# Patient Record
Sex: FEMALE | Race: WHITE | NOT HISPANIC OR LATINO | Employment: UNEMPLOYED | URBAN - METROPOLITAN AREA
[De-identification: names, ages, dates, MRNs, and addresses within clinical notes are randomized per-mention and may not be internally consistent; named-entity substitution may affect disease eponyms.]

---

## 2018-09-19 ENCOUNTER — HOSPITAL ENCOUNTER (EMERGENCY)
Facility: HOSPITAL | Age: 38
Discharge: HOME | End: 2018-09-19
Attending: EMERGENCY MEDICINE | Admitting: EMERGENCY MEDICINE
Payer: COMMERCIAL

## 2018-09-19 ENCOUNTER — APPOINTMENT (EMERGENCY)
Dept: RADIOLOGY | Facility: HOSPITAL | Age: 38
End: 2018-09-19
Payer: COMMERCIAL

## 2018-09-19 VITALS
OXYGEN SATURATION: 100 % | WEIGHT: 125 LBS | SYSTOLIC BLOOD PRESSURE: 144 MMHG | RESPIRATION RATE: 18 BRPM | TEMPERATURE: 98.3 F | BODY MASS INDEX: 20.09 KG/M2 | HEIGHT: 66 IN | DIASTOLIC BLOOD PRESSURE: 82 MMHG | HEART RATE: 101 BPM

## 2018-09-19 DIAGNOSIS — S63.601A SPRAIN OF RIGHT THUMB: Primary | ICD-10-CM

## 2018-09-19 PROCEDURE — 99283 EMERGENCY DEPT VISIT LOW MDM: CPT

## 2018-09-19 PROCEDURE — 73140 X-RAY EXAM OF FINGER(S): CPT

## 2018-09-20 NOTE — DISCHARGE INSTRUCTIONS
1 \[facility]\"" Academy of Orthopaedic Surgeons (AAOS): Sprained thumb  American Academy of Orthopaedic Surgeons (AAOS)  [address], [place]  2010  Available from URL: http://orthoinfo  aaos  org/topic cfm?umcis=P49044  As accessed 2010-12-14  Flaquito AW, Deanna Norman, & Mayte CM: Ulnar collateral ligament injury of the thumb metacarpophalangeal joint  Clin J Sport Med 2010; 20(2):106-112  THE [facility] ([facility]): Sprains and strains  THE [facility] ([facility])  [place], [place]  2009  Available from URL: [employer]  [facility] gov/Health_Info/Sprains_Strains/sprains_[email or web address]_strains_ff asp  As accessed 2010-12-14  American Academy of Orthopaedic Surgeons (AAOS): Sprains and strains: what's the difference? Lorena Kelly American Academy of Orthopaedic Surgeons (AAOS)  [address] [place] [address]  2007  Available from URL: http://orthoinfo  aaos  org/topic cfm?gvudr=K10491  As accessed 2010-12-14  [employer] [facility]: Sprains, strains & tears: What they are and what to do about them  [employer] [facility]  [place], [place]  2005  Available from URL: CommonFloorTimer gl  org/AM/Template cfm? Section=Brochures2&Template=/CM/ContentDisplay  cfm&OuchectZF=8191  As accessed 2010-12-14  Isaiah SC & Yves BG: Management of patients with musculoskeletal trauma  In: [place] [address] of Medical-Surgical Nursing, 10th ed  [address] [facility] [place], [place], 2004  Anne DF & Ubaldo TC: Management of common finger injuries  [address]; 43(5):4118-6935  Wilmer Kaiser [address] [place] [address]: Sprains and joint injuries in the hand  Hand Clin 1986; 2(1):93-98  © 2017 [address] Information is for End User's use only and may not be sold, redistributed or otherwise used for commercial purposes   All illustrations and images included in CareNotes® are the copyrighted property of [employer]  or [employer]  The above information is an  only  It is not intended as medical advice for individual conditions or treatments  Talk to your doctor, nurse or pharmacist before following any medical regimen to see if it is safe and effective for you

## 2018-09-20 NOTE — ED PROVIDER NOTES
History  Chief Complaint   Patient presents with    Thumb Injury     pt states she fell yesterday landing on her right thymb, pt believes its broke     Patient is a 45 year female with complaints of right thumb pain x1 day  Patient is a right-handed individual   Patient states that she tripped and fell on right thumb yesterday  Patient denies previous thumb injury  Hand Injury   Location:  Finger  Finger location:  R thumb  Injury: yes    Time since incident:  1 day  Mechanism of injury: fall    Pain details:     Radiates to:  Does not radiate    Severity:  Moderate    Progression:  Unchanged  Handedness:  Right-handed  Dislocation: no    Associated symptoms: no fever        None       History reviewed  No pertinent past medical history  History reviewed  No pertinent surgical history  History reviewed  No pertinent family history  I have reviewed and agree with the history as documented  Social History   Substance Use Topics    Smoking status: Current Every Day Smoker     Packs/day: 0 50     Types: Cigarettes    Smokeless tobacco: Not on file    Alcohol use No        Review of Systems   Constitutional: Negative for fever  Respiratory: Negative for shortness of breath  Cardiovascular: Negative for chest pain  Musculoskeletal: Positive for joint swelling  All other systems reviewed and are negative  Physical Exam  Physical Exam   Constitutional: She is oriented to person, place, and time  She appears well-developed and well-nourished  HENT:   Head: Normocephalic and atraumatic  Musculoskeletal:        Right hand: She exhibits tenderness, bony tenderness and swelling  She exhibits normal range of motion  Normal sensation noted  Hands:  Neurological: She is alert and oriented to person, place, and time  Skin: Skin is warm and dry  Psychiatric: She has a normal mood and affect  Her behavior is normal  Judgment and thought content normal    Vitals reviewed        Vital Signs  ED Triage Vitals [09/19/18 2018]   Temperature Pulse Respirations Blood Pressure SpO2   98 3 °F (36 8 °C) 101 18 144/82 100 %      Temp Source Heart Rate Source Patient Position - Orthostatic VS BP Location FiO2 (%)   Oral Monitor Sitting Left arm --      Pain Score       --           Vitals:    09/19/18 2018   BP: 144/82   Pulse: 101   Patient Position - Orthostatic VS: Sitting       Visual Acuity      ED Medications  Medications - No data to display    Diagnostic Studies  Results Reviewed     None                 XR thumb first digit-min 2 views RIGHT   ED Interpretation by Betty Gil PA-C (09/19 2055)   No acute fracture or dislocation noted                 Procedures  Procedures       Phone Contacts  ED Phone Contact    ED Course                               MDM  CritCare Time    Disposition  Final diagnoses:   Sprain of right thumb     Time reflects when diagnosis was documented in both MDM as applicable and the Disposition within this note     Time User Action Codes Description Comment    9/19/2018  8:49 PM Rani Woodward Add [H38 209P] Sprain of right thumb       ED Disposition     ED Disposition Condition Comment    Discharge  Leticia Reenstra discharge to home/self care  Condition at discharge: Good        Follow-up Information     Follow up With Specialties Details Why [address] [facility] In 1 day  [address] [facility] [address]  [phone]          Patient's Medications    No medications on file     No discharge procedures on file      ED Provider  Electronically Signed by           Betty Gil PA-C  09/19/18 2121

## 2020-06-23 ENCOUNTER — OFFICE VISIT (OUTPATIENT)
Dept: URGENT CARE | Facility: MEDICAL CENTER | Age: 40
End: 2020-06-23
Payer: COMMERCIAL

## 2020-06-23 VITALS
HEART RATE: 100 BPM | DIASTOLIC BLOOD PRESSURE: 68 MMHG | OXYGEN SATURATION: 98 % | RESPIRATION RATE: 18 BRPM | TEMPERATURE: 97.3 F | SYSTOLIC BLOOD PRESSURE: 133 MMHG

## 2020-06-23 DIAGNOSIS — H02.843 SWELLING OF RIGHT EYELID: Primary | ICD-10-CM

## 2020-06-23 PROCEDURE — G0382 LEV 3 HOSP TYPE B ED VISIT: HCPCS | Performed by: PHYSICIAN ASSISTANT

## 2020-06-23 PROCEDURE — 99283 EMERGENCY DEPT VISIT LOW MDM: CPT | Performed by: PHYSICIAN ASSISTANT

## 2020-06-23 PROCEDURE — 99203 OFFICE O/P NEW LOW 30 MIN: CPT | Performed by: PHYSICIAN ASSISTANT

## 2020-06-23 RX ORDER — AMOXICILLIN 500 MG/1
500 CAPSULE ORAL EVERY 12 HOURS SCHEDULED
Qty: 14 CAPSULE | Refills: 0 | Status: SHIPPED | OUTPATIENT
Start: 2020-06-23 | End: 2020-06-30

## 2021-01-03 ENCOUNTER — OFFICE VISIT (OUTPATIENT)
Dept: URGENT CARE | Facility: MEDICAL CENTER | Age: 41
End: 2021-01-03
Payer: COMMERCIAL

## 2021-01-03 VITALS
RESPIRATION RATE: 18 BRPM | OXYGEN SATURATION: 100 % | SYSTOLIC BLOOD PRESSURE: 149 MMHG | WEIGHT: 134 LBS | TEMPERATURE: 98.1 F | BODY MASS INDEX: 21.53 KG/M2 | HEART RATE: 96 BPM | HEIGHT: 66 IN | DIASTOLIC BLOOD PRESSURE: 80 MMHG

## 2021-01-03 DIAGNOSIS — H02.843 SWELLING OF EYELID, RIGHT: Primary | ICD-10-CM

## 2021-01-03 PROCEDURE — G0382 LEV 3 HOSP TYPE B ED VISIT: HCPCS | Performed by: PHYSICIAN ASSISTANT

## 2021-01-03 PROCEDURE — 99283 EMERGENCY DEPT VISIT LOW MDM: CPT | Performed by: PHYSICIAN ASSISTANT

## 2021-01-03 PROCEDURE — 99213 OFFICE O/P EST LOW 20 MIN: CPT | Performed by: PHYSICIAN ASSISTANT

## 2021-01-03 RX ORDER — MEDROXYPROGESTERONE ACETATE 150 MG/ML
150 INJECTION, SUSPENSION INTRAMUSCULAR
COMMUNITY
Start: 2020-11-25

## 2021-01-03 RX ORDER — PREDNISONE 20 MG/1
20 TABLET ORAL DAILY
Qty: 5 TABLET | Refills: 0 | Status: SHIPPED | OUTPATIENT
Start: 2021-01-03

## 2021-01-03 NOTE — PROGRESS NOTES
[facility]        NAME: Herb Reveles is a 36 y o  female  : 1980    MRN: [id number]  DATE: January 3, 2021  TIME: 1:38 PM    Assessment and Plan   Swelling of eyelid, right [H02 [id number]]  1  Swelling of eyelid, right  predniSONE 20 mg tablet         Patient Instructions   Cool compresses to eye  Steroids for swelling  Follow up with PCP in 3-5 days  Proceed to  ER if symptoms worsen  Chief Complaint     Chief Complaint   Patient presents with    Eye Problem     Pt  with swelling to her right eye that began a couple days ago  History of Present Illness       Patient is a 44-year-old female who presents today with right eyelid swelling for the past few days  Denies pain and states it is itchy, she has been rubbing it  Denies foreign body sensation or redness of the eye  No discharge  Has been trying to put cool lotion on the eye to help the itching  She has not started any new medications, no new lotions, soaps, detergent  No blurred vision  Review of Systems   Review of Systems   Constitutional: Negative for fever  Eyes: Positive for itching  Negative for photophobia, pain, discharge, redness and visual disturbance  Eyelid swelling   Respiratory: Negative for shortness of breath  Cardiovascular: Negative for chest pain           Current Medications       Current Outpatient Medications:     medroxyPROGESTERone acetate (DEPO-PROVERA SYRINGE) 150 mg/mL injection, Inject 150 mg into a muscle, Disp: , Rfl:     predniSONE 20 mg tablet, Take 1 tablet (20 mg total) by mouth daily, Disp: 5 tablet, Rfl: 0    Current Allergies     Allergies as of 2021 - Reviewed 2021   Allergen Reaction Noted    Sulfa antibiotics Hives 2016            The following portions of the patient's history were reviewed and updated as appropriate: allergies, current medications, past family history, past medical history, past social history, past surgical history and problem list      No past medical history on file  No past surgical history on file  No family history on file  Medications have been verified  Objective   /80   Pulse 96   Temp 98 1 °F (36 7 °C)   Resp 18   Ht 5' 6" (1 676 m)   Wt 60 8 kg (134 lb)   SpO2 100%   BMI 21 63 kg/m²        Physical Exam     Physical Exam  Constitutional:       General: She is not in acute distress  Appearance: Normal appearance  She is normal weight  HENT:      Mouth/Throat:      Mouth: Mucous membranes are moist       Pharynx: Oropharynx is clear  Eyes:      General: Lids are everted, no foreign bodies appreciated  Vision grossly intact  Right eye: No foreign body, discharge or hordeolum  Extraocular Movements: Extraocular movements intact  Conjunctiva/sclera: Conjunctivae normal       Pupils: Pupils are equal, round, and reactive to light  Cardiovascular:      Rate and Rhythm: Normal rate and regular rhythm  Pulmonary:      Effort: Pulmonary effort is normal    Skin:     General: Skin is warm and dry  Neurological:      Mental Status: She is alert

## 2021-10-07 ENCOUNTER — OFFICE VISIT (OUTPATIENT)
Dept: URGENT CARE | Facility: MEDICAL CENTER | Age: 41
End: 2021-10-07
Payer: COMMERCIAL

## 2021-10-07 VITALS
RESPIRATION RATE: 18 BRPM | DIASTOLIC BLOOD PRESSURE: 89 MMHG | TEMPERATURE: 98.6 F | OXYGEN SATURATION: 100 % | HEIGHT: 66 IN | BODY MASS INDEX: 21.69 KG/M2 | WEIGHT: 135 LBS | SYSTOLIC BLOOD PRESSURE: 132 MMHG | HEART RATE: 86 BPM

## 2021-10-07 DIAGNOSIS — T14.8XXA ABRASION: ICD-10-CM

## 2021-10-07 DIAGNOSIS — M79.672 LEFT FOOT PAIN: Primary | ICD-10-CM

## 2021-10-07 DIAGNOSIS — S90.32XA CONTUSION OF LEFT FOOT, INITIAL ENCOUNTER: ICD-10-CM

## 2021-10-07 PROCEDURE — 99213 OFFICE O/P EST LOW 20 MIN: CPT | Performed by: PHYSICIAN ASSISTANT

## 2021-10-07 PROCEDURE — 90715 TDAP VACCINE 7 YRS/> IM: CPT

## 2023-06-07 ENCOUNTER — TELEPHONE (OUTPATIENT)
Dept: PSYCHIATRY | Facility: CLINIC | Age: 43
End: 2023-06-07

## 2023-06-07 NOTE — TELEPHONE ENCOUNTER
Writer rec a vm from patient regarding family counseling and writer called back and could not leave a message due to phone kept disconnecting

## 2023-06-09 ENCOUNTER — TELEPHONE (OUTPATIENT)
Dept: PSYCHIATRY | Facility: CLINIC | Age: 43
End: 2023-06-09

## 2023-06-09 NOTE — TELEPHONE ENCOUNTER
Patient has been added to the [facility] wait list without a referral     Insurance: [employer]  Insurance Type:    Commercial []   Medicaid []   [place] (if applicable)   Medicare []  [facility]  Provider Preference: female  Virtual: Yes [] No []    Mailed outside [facility]

## 2024-05-17 ENCOUNTER — TELEPHONE (OUTPATIENT)
Dept: PSYCHIATRY | Facility: CLINIC | Age: 44
End: 2024-05-17

## 2024-05-17 NOTE — TELEPHONE ENCOUNTER
Contacted patient off of [facility]  wait list to verify needs of services in attempts to update list with patient preferences. LVM for patient to contact intake dept  in regards to attempts to sched appts or remove from waitlist.    1st call attempt